# Patient Record
Sex: MALE | Race: WHITE | HISPANIC OR LATINO | Employment: FULL TIME | URBAN - METROPOLITAN AREA
[De-identification: names, ages, dates, MRNs, and addresses within clinical notes are randomized per-mention and may not be internally consistent; named-entity substitution may affect disease eponyms.]

---

## 2020-01-29 ENCOUNTER — APPOINTMENT (OUTPATIENT)
Dept: RADIOLOGY | Facility: MEDICAL CENTER | Age: 45
End: 2020-01-29
Attending: EMERGENCY MEDICINE
Payer: COMMERCIAL

## 2020-01-29 ENCOUNTER — HOSPITAL ENCOUNTER (EMERGENCY)
Facility: MEDICAL CENTER | Age: 45
End: 2020-01-29
Attending: EMERGENCY MEDICINE
Payer: COMMERCIAL

## 2020-01-29 VITALS
HEIGHT: 69 IN | BODY MASS INDEX: 27.17 KG/M2 | WEIGHT: 183.42 LBS | DIASTOLIC BLOOD PRESSURE: 95 MMHG | OXYGEN SATURATION: 98 % | SYSTOLIC BLOOD PRESSURE: 130 MMHG | RESPIRATION RATE: 18 BRPM | TEMPERATURE: 99.9 F | HEART RATE: 66 BPM

## 2020-01-29 DIAGNOSIS — S61.311A LACERATION OF LEFT INDEX FINGER WITHOUT FOREIGN BODY WITH DAMAGE TO NAIL, INITIAL ENCOUNTER: ICD-10-CM

## 2020-01-29 PROCEDURE — 90471 IMMUNIZATION ADMIN: CPT

## 2020-01-29 PROCEDURE — 304217 HCHG IRRIGATION SYSTEM

## 2020-01-29 PROCEDURE — 99283 EMERGENCY DEPT VISIT LOW MDM: CPT

## 2020-01-29 PROCEDURE — 73140 X-RAY EXAM OF FINGER(S): CPT | Mod: LT

## 2020-01-29 PROCEDURE — 303747 HCHG EXTRA SUTURE

## 2020-01-29 PROCEDURE — 90715 TDAP VACCINE 7 YRS/> IM: CPT | Performed by: EMERGENCY MEDICINE

## 2020-01-29 PROCEDURE — 700111 HCHG RX REV CODE 636 W/ 250 OVERRIDE (IP): Performed by: EMERGENCY MEDICINE

## 2020-01-29 PROCEDURE — 700101 HCHG RX REV CODE 250

## 2020-01-29 PROCEDURE — 304999 HCHG REPAIR-SIMPLE/INTERMED LEVEL 1

## 2020-01-29 RX ORDER — LIDOCAINE HYDROCHLORIDE AND EPINEPHRINE BITARTRATE 20; .01 MG/ML; MG/ML
10 INJECTION, SOLUTION SUBCUTANEOUS ONCE
Status: COMPLETED | OUTPATIENT
Start: 2020-01-29 | End: 2020-01-29

## 2020-01-29 RX ADMIN — LIDOCAINE HYDROCHLORIDE,EPINEPHRINE BITARTRATE 10 ML: 20; .01 INJECTION, SOLUTION INFILTRATION; PERINEURAL at 13:45

## 2020-01-29 RX ADMIN — CLOSTRIDIUM TETANI TOXOID ANTIGEN (FORMALDEHYDE INACTIVATED), CORYNEBACTERIUM DIPHTHERIAE TOXOID ANTIGEN (FORMALDEHYDE INACTIVATED), BORDETELLA PERTUSSIS TOXOID ANTIGEN (GLUTARALDEHYDE INACTIVATED), BORDETELLA PERTUSSIS FILAMENTOUS HEMAGGLUTININ ANTIGEN (FORMALDEHYDE INACTIVATED), BORDETELLA PERTUSSIS PERTACTIN ANTIGEN, AND BORDETELLA PERTUSSIS FIMBRIAE 2/3 ANTIGEN 0.5 ML: 5; 2; 2.5; 5; 3; 5 INJECTION, SUSPENSION INTRAMUSCULAR at 13:39

## 2020-01-29 SDOH — HEALTH STABILITY: MENTAL HEALTH: HOW OFTEN DO YOU HAVE 6 OR MORE DRINKS ON ONE OCCASION?: WEEKLY

## 2020-01-29 SDOH — HEALTH STABILITY: MENTAL HEALTH: HOW OFTEN DO YOU HAVE A DRINK CONTAINING ALCOHOL?: 2-3 TIMES A WEEK

## 2020-01-29 SDOH — HEALTH STABILITY: MENTAL HEALTH: HOW MANY STANDARD DRINKS CONTAINING ALCOHOL DO YOU HAVE ON A TYPICAL DAY?: 7 TO 9

## 2020-01-29 ASSESSMENT — PAIN SCALES - WONG BAKER: WONGBAKER_NUMERICALRESPONSE: DOESN'T HURT AT ALL

## 2020-01-29 NOTE — ED PROVIDER NOTES
"ED Provider Note    CHIEF COMPLAINT  Chief Complaint   Patient presents with   • Laceration       HPI  Charles Eugene is a 44 y.o. male who presents with a laceration to the tip of the left second phalanx.  The patient inadvertently cut himself on a metal pallet while at work.  He presents the emerge department with localized discomfort to the tip of the left second phalanx where he has a laceration that goes through the nailbed as well as to the ulnar and radial aspect of the finger into the volar aspect of the left second phalanx.  The patient does not have any loss of extension or flexion at the IP joints.  He is unaware of any other injuries.  He states his tetanus is not up-to-date.    REVIEW OF SYSTEMS  No other musculoskeletal complaints, no recent fevers    PHYSICAL EXAM  VITAL SIGNS: /88   Pulse 66   Temp 36.8 °C (98.3 °F) (Temporal)   Resp 16   Ht 1.753 m (5' 9\")   Wt 83.2 kg (183 lb 6.8 oz) Comment: Simultaneous filing. User may not have seen previous data.  SpO2 96%   BMI 27.09 kg/m²   In general the patient appears uncomfortable but nontoxic    Extremities the patient has a laceration that goes through the nail plate dorsally on the left second phalanx and into the palmar aspect of the left second phalanx with a total length laceration approximately 1 cm.  The patient does not have any skeletal deformities proximal to the DIP joint of the left second phalanx.  The distal phalanx does appear slightly angulated consistent with a tuft fracture.    Skin the laceration described above    Neurovascular examination is intact of the left hand    RADIOLOGY/  DX-FINGER(S) 2+ LEFT   Final Result      1.  Negative for 2nd digit fracture      2.  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  After the digital block was performed we irrigated the wound with " saline.  I then placed one 4-0 Ethilon suture through the nail plate for support as well as one 4-0 Ethilon suture to the radial aspect of the nail for closure.  The patient tolerated the procedure well.    COURSE & MEDICAL DECISION MAKING  Pertinent Labs & Imaging studies reviewed. (See chart for details)  This is a 44-year-old male who presents the emerge department with a crush injury to the distal left second phalanx.  He did have a laceration that included the nail plate and this was repaired primarily.  The patient will receive tetanus prophylaxis prior to discharge.  The patient will be discharged with instructions to follow-up with [facility] in 10 days for suture removal and sooner for signs of infection.  The x-ray does not show any evidence of a fracture.    FINAL IMPRESSION  1.  1 cm laceration to left second phalanx involving the nail plate       Disposition  The patient will be discharged in stable condition    Electronically signed by: Dylan Kessler M.D., 1/29/2020 1:28 PM

## 2020-01-29 NOTE — ED TRIAGE NOTES
Pt ambulates to triage with co worker  Chief Complaint   Patient presents with   • Laceration   laceration to left 1t finger from a metal pallet at work, wound dressed, not bleeding through dressing, pt reports laceration is through nail.  Pt A & 0 x 4, speech clear, ambulates well  Pt asked to wait in lobby, pt updated on triage process and pt asked to inform RN of any changes.

## 2020-01-29 NOTE — LETTER
"  FORM C-4:  EMPLOYEE’S CLAIM FOR COMPENSATION/ REPORT OF INITIAL TREATMENT  EMPLOYEE’S CLAIM - PROVIDE ALL INFORMATION REQUESTED   First Name Charles Last Name Jabier Birthdate 1975  Sex male Claim Number   Home Employee Address [address]                                     Zip  [address] Height  1.753 m (5' 9\") Weight  83.2 kg (183 lb 6.8 oz) [facility]     Mailing Employee Address [address]               Zip  [address] Telephone  [phone] (home)  Primary Language Spoken   Insurer   Third Party   BERTO GERMAN Employee's Occupation (Job Title) When Injury or Occupational Disease Occurred     Employer's Name [employer] Telephone [phone]    Employer Address [address] [facility] [address] Zip [address]   Date of Injury  1/29/2020       Hour of Injury  11:15 AM Date Employer Notified  1/29/2020 Last Day of Work after Injury or Occupational Disease  1/29/2020 Supervisor to Whom Injury Reported  Hiren Cartwright   Address or Location of Accident (if applicable) [[address] [place] [address]]   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 Be specific and answer in detail. Use additional sheet if necessary)  Smash my finger between a tire and a metal pallet.   If you believe that you have an occupational disease, when did you first have knowledge of the disability and it relationship to your employment? N/A Witnesses to the Accident  Camron Hunderson   Nature of Injury or Occupational Disease  Workers' Compensation Part(s) of Body Injured or Affected  Finger (L), N/A, N/A    I CERTIFY THAT THE ABOVE IS TRUE AND CORRECT TO THE BEST OF MY KNOWLEDGE AND THAT I HAVE PROVIDED THIS INFORMATION IN ORDER TO OBTAIN THE BENEFITS OF [place] " INCLUSIVE OR CHAPTER 617 [place]).  I HEREBY AUTHORIZE ANY PHYSICIAN, CHIROPRACTOR, SURGEON, PRACTITIONER, OR OTHER PERSON, ANY HOSPITAL, INCLUDING [facility] OR [facility] H[facility]PITAL, ANY MEDICAL SERVICE ORGANIZATION, ANY INSURANCE COMPANY, OR OTHER INSTITUTION OR ORGANIZATION TO RELEASE TO EACH OTHER, ANY MEDICAL OR OTHER INFORMATION, INCLUDING BENEFITS PAID OR PAYABLE, PERTINENT TO THIS INJURY OR DISEASE, EXCEPT INFORMATION RELATIVE TO DIAGNOSIS, TREATMENT AND/OR COUNSELING FOR AIDS, PSYCHOLOGICAL CONDITIONS, ALCOHOL OR CONTROLLED SUBSTANCES, FOR WHICH I MUST GIVE SPECIFIC AUTHORIZATION.  A PHOTOSTAT OF THIS AUTHORIZATION SHALL BE AS VALID AS THE ORIGINAL.  Date: 01/29/2020          Place:[facility]                      Employee’s Signature:   THIS REPORT MUST BE COMPLETED AND MAILED WITHIN 3 WORKING DAYS OF TREATMENT   [facility], EMERGENCY DEPT                                                                             Name of Facility [facility]   Date  1/29/2020 Diagnosis  (S61.311A) Laceration of left index finger without foreign body with damage to nail, initial encounter Is there evidence the injured employee was under the influence of alcohol and/or another controlled substance at the time of accident?   Hour  3:02 PM Description of Injury or Disease  Laceration of left index finger without foreign body with damage to nail, initial encounter No   Treatment  Primary closure after irrigation  Have you advised the patient to remain off work five days or more?         No   X-Ray Findings  Negative If Yes   From Date    To Date      From information given by the employee, together with medical evidence, can you directly connect this injury or occupational disease as job incurred? Yes If No, is employee capable of: Full Duty  Yes Modified Duty      Is additional medical care by a physician indicated? Yes If Modified Duty,  "Specify any Limitations / Restrictions       Do you know of any previous injury or disease contributing to this condition or occupational disease? No    Date 1/29/2020 Print Doctor’s Name Dylan Kessler certify the employer’s copy of this form was mailed on:   Address [address] [id number] [phone] INSURER’S USE ONLY   Provider’s Tax ID Number [id number] Telephone Dept: [phone]    Doctor’s Signature e-DYLAN Grant M.D. Degree     MD      Form C-4 (rev.10/07)                                                                         BRIEF DESCRIPTION OF RIGHTS AND BENEFITS  (Pursuant to [place] 616C.050)    Notice of Injury or Occupational Disease (Incident Report Form C-1): If an injury or occupational disease (OD) arises out of and in the course of employment, you must provide written notice to your employer as soon as practicable, but no later than 7 days after the accident or OD. Your employer shall maintain a sufficient supply of the required forms.    Claim for Compensation (Form C-4): If medical treatment is sought, the form C-4 is available at the place of initial treatment. A completed \"Claim for Compensation\" (Form C-4) must be filed within 90 days after an accident or OD. The treating physician or chiropractor must, within 3 working days after treatment, complete and mail to the employer, the employer's insurer and third-party , the Claim for Compensation.    Medical Treatment: If you require medical treatment for your on-the-job injury or OD, you may be required to select a physician or chiropractor from a list provided by your workers’ compensation insurer, if it has contracted with an Organization for Managed Care (MCO) or Preferred Provider Organization (PPO) or providers of health care. If your employer has not entered into a contract with an MCO or PPO, you may select a physician or chiropractor from the Panel of Physicians and Chiropractors. Any " medical costs related to your industrial injury or OD will be paid by your insurer.    Temporary Total Disability (TTD): If your doctor has certified that you are unable to work for a period of at least 5 consecutive days, or 5 cumulative days in a 20-day period, or places restrictions on you that your employer does not accommodate, you may be entitled to TTD compensation.    Temporary Partial Disability (TPD): If the wage you receive upon reemployment is less than the compensation for TTD to which you are entitled, the insurer may be required to pay you TPD compensation to make up the difference. TPD can only be paid for a maximum of 24 months.    Permanent Partial Disability (PPD): When your medical condition is stable and there is an indication of a PPD as a result of your injury or OD, within 30 days, your insurer must arrange for an evaluation by a rating physician or chiropractor to determine the degree of your PPD. The amount of your PPD award depends on the date of injury, the results of the PPD evaluation and your age and wage.    Permanent Total Disability (PTD): If you are medically certified by a treating physician or chiropractor as permanently and totally disabled and have been granted a PTD status by your insurer, you are entitled to receive monthly benefits not to exceed 66 2/3% of your average monthly wage. The amount of your PTD payments is subject to reduction if you previously received a PPD award.    Vocational Rehabilitation Services: You may be eligible for vocational rehabilitation services if you are unable to return to the job due to a permanent physical impairment or permanent restrictions as a result of your injury or occupational disease.    Transportation and Per Smita Reimbursement: You may be eligible for travel expenses and per smita associated with medical treatment.    Reopening: You may be able to reopen your claim if your condition worsens after claim closure.     Appeal Process:  If you disagree with a written determination issued by the insurer or the insurer does not respond to your request, you may appeal to the Department of Administration, , by following the instructions contained in your determination letter. You must appeal the determination within 70 days from the date of the determination letter at [address], [address], [place], [place] [address], or [address], [address], [place], [place] [address]. If you disagree with the  decision, you may appeal to the Department of Administration, . You must file your appeal within 30 days from the date of the  decision letter at [address], [address], [place], [place] [address], or [address], [address], [place], [place] [address]. If you disagree with a decision of an , you may file a petition for judicial review with the District Court. You must do so within 30 days of the Appeal Officer’s decision. You may be represented by an  at your own expense or you may contact the [facility] for possible representation.    [place]  for Injured Workers (NAIW): If you disagree with a  decision, you may request that NAIW represent you without charge at an  Hearing. For information regarding denial of benefits, you may contact the [facility] at: [address], [address], [place] [address], [phone], or [address], [address], [place] [address], [phone]    To File a Complaint with the Division: If you wish to file a complaint with the  of the Division of Industrial Relations (DIR),  please contact the Workers’ Compensation Section, [address] [place], [place], [place] [address], telephone [phone], or [address], [address], [place], [place] [address], telephone [phone].    For assistance with Workers’ Compensation Issues: You may  contact the Office of the Governor Consumer Health Assistance, [address] [place] [address], [address], [place], [place] [address], Toll Free 1-155.847.8066, Web site: http://govcha.[facility].nv., E-mail [email or web address].  D-2 (rev. 06/18)              __________________________________________________________________                                    ___01/29/2020______________            Employee Name / Signature                                                                                                                            Date

## 2020-01-29 NOTE — ED NOTES
ERP at bedside. Pt agrees with plan of care discussed by ERP. AIDET acknowledged with patient.  Lac numbed, irrigated , dressing applied. X-александр done. Gurney in low position, side rail up for pt safety. Call light within reach. Will continue to monitor.

## 2020-02-04 ENCOUNTER — OCCUPATIONAL MEDICINE (OUTPATIENT)
Dept: URGENT CARE | Facility: CLINIC | Age: 45
End: 2020-02-04
Payer: COMMERCIAL

## 2020-02-04 VITALS
DIASTOLIC BLOOD PRESSURE: 82 MMHG | HEART RATE: 60 BPM | BODY MASS INDEX: 26.58 KG/M2 | OXYGEN SATURATION: 100 % | SYSTOLIC BLOOD PRESSURE: 116 MMHG | TEMPERATURE: 98.6 F | WEIGHT: 180 LBS | RESPIRATION RATE: 18 BRPM

## 2020-02-04 DIAGNOSIS — S61.311D LACERATION OF LEFT INDEX FINGER WITHOUT FOREIGN BODY WITH DAMAGE TO NAIL, SUBSEQUENT ENCOUNTER: ICD-10-CM

## 2020-02-04 PROCEDURE — 99213 OFFICE O/P EST LOW 20 MIN: CPT | Performed by: FAMILY MEDICINE

## 2020-02-04 NOTE — LETTER
[facility] [address] [place] [address]  Phone:  [phone] - Fax:  [phone]   [facility] Progress Report and Disability Certification  Date of Service: 2/4/2020   No Show:  No  Date / Time of Next Visit: 2/09/2020@12:00 PM   Claim Information   Patient Name: Charles Eugene  Claim Number:     Employer: [employer]  Date of Injury: 1/29/2020     Insurer / TPA: Sharon Luo  ID / SSN:     Occupation:   Diagnosis: The encounter diagnosis was Laceration of left index finger without foreign body with damage to nail, subsequent encounter.    Medical Information   Related to Industrial Injury? Yes    Subjective Complaints:    DOI: 1/29    Status post crush injury to distal left 2nd phalanx with laceration repaired with sutures.       Reports no pain or fever or discharge from wound     Objective Findings:     Left index finger:   Laceration is clean and dry with sutures intact.  Cap refill < 2 s.    Normal sensation noted. Normal strength noted.    Pre-Existing Condition(s):     Assessment:   Condition Improved    Status: Additional Care Required  Permanent Disability:No    Plan:      Diagnostics:      Comments:       Disability Information   Status: Released to Restricted Duty    From:  2/4/2020  Through: 2/11/2020 Restrictions are:     Physical Restrictions   Sitting:    Standing:    Stooping:    Bending:      Squatting:    Walking:    Climbing:    Pushing:      Pulling:    Other:    Reaching Above Shoulder (L):   Reaching Above Shoulder (R):       Reaching Below Shoulder (L):    Reaching Below Shoulder (R):      Not to exceed Weight Limits   Carrying(hrs):   Weight Limit(lb): < or = to 10 pounds  Comments:left Lifting(hrs):   Weight  Limit(lb): < or = to 10 pounds  Comments:left   Comments: Laceration of left index finger without foreign body with damage to nail, subsequent encounter  Healing well - no signs of  infection  Restrictions per D39  F/u 5-7 d for suture removal       Repetitive Actions   Hands: i.e. Fine Manipulations from Grasping:     Feet: i.e. Operating Foot Controls:     Driving / Operate Machinery:     Physician Name: Edmar Langford M.D. Physician Signature: EDMAR Ochoa M.D. e-Signature: Dr. Nagi Salazar, Medical Director   Clinic Name / Location: [address] [address] Clinic Phone Number: Dept: [phone]   Appointment Time: 12:30 Pm Visit Start Time: 12:33 PM   Check-In Time:  12:21 Pm Visit Discharge Time:  1:26 PM   Original-Treating Physician or Chiropractor    Page 2-Insurer/TPA    Page 3-Employer    Page 4-Employee

## 2020-02-04 NOTE — PROGRESS NOTES
Subjective:      Chief Complaint   Patient presents with   • Finger Injury     WC FV DOI-1/29/20-(L)hurts when moving too much         HPI     DOI: 1/29    Status post crush injury to distal left 2nd phalanx with laceration repaired with sutures.       Reports no pain or fever or discharge from wound             Social History     Tobacco Use   • Smoking status: Never Smoker   • Smokeless tobacco: Never Used   Substance Use Topics   • Alcohol use: Yes     Frequency: 2-3 times a week     Drinks per session: 7 to 9     Binge frequency: Weekly   • Drug use: Never             No current outpatient medications on file prior to visit.     No current facility-administered medications on file prior to visit.          No past medical history on file.            Review of Systems   Constitutional: Negative for fever.   Respiratory: Negative for cough.    Cardiovascular: Negative for chest pain.   All other systems reviewed and are negative.         Objective:     /82 (BP Location: Left arm)   Pulse 60   Temp 37 °C (98.6 °F) (Temporal)   Resp 18   Wt 81.6 kg (180 lb)   SpO2 100%     Physical Exam   Constitutional: pt is oriented to person, place, and time. Pt appears well-developed and well-nourished. No distress.   HENT:   Head: Normocephalic and atraumatic.   Eyes: Conjunctivae are normal.   Cardiovascular: Normal rate.  RRR.  No murmer   Pulmonary/Chest: Effort normal.  CTAB  Musculoskeletal:        Left index finger:   Laceration is clean and dry with sutures intact.  Cap refill < 2 s.    Normal sensation noted. Normal strength noted.   Neurological: pt is alert and oriented to person, place, and time.   Skin: Skin is warm. Pt is not diaphoretic. No erythema.   Nursing note and vitals reviewed.              Assessment/Plan:     1. Laceration of left index finger without foreign body with damage to nail, subsequent encounter  Healing well - no signs of infection  Restrictions per D39  F/u 5-7 d for suture removal

## 2020-02-09 ENCOUNTER — OCCUPATIONAL MEDICINE (OUTPATIENT)
Dept: URGENT CARE | Facility: CLINIC | Age: 45
End: 2020-02-09
Payer: COMMERCIAL

## 2020-02-09 VITALS
HEIGHT: 69 IN | BODY MASS INDEX: 26.66 KG/M2 | OXYGEN SATURATION: 99 % | RESPIRATION RATE: 16 BRPM | TEMPERATURE: 98 F | DIASTOLIC BLOOD PRESSURE: 72 MMHG | HEART RATE: 80 BPM | SYSTOLIC BLOOD PRESSURE: 118 MMHG | WEIGHT: 180 LBS

## 2020-02-09 DIAGNOSIS — S61.311S LACERATION OF LEFT INDEX FINGER WITHOUT FOREIGN BODY WITH DAMAGE TO NAIL, SEQUELA: ICD-10-CM

## 2020-02-09 DIAGNOSIS — Z48.02 VISIT FOR SUTURE REMOVAL: ICD-10-CM

## 2020-02-09 PROCEDURE — 99213 OFFICE O/P EST LOW 20 MIN: CPT | Performed by: NURSE PRACTITIONER

## 2020-02-09 ASSESSMENT — ENCOUNTER SYMPTOMS
ROS SKIN COMMENTS: HEALING LACERATION
FEVER: 0
SHORTNESS OF BREATH: 0
CHILLS: 0

## 2020-02-09 NOTE — PROGRESS NOTES
"Subjective:   Charles Eugene is a 44 y.o. male who presents for Work-Related Injury (W/C FV DOI 1/29/2020. (L) finger injury, feeling. 2 suture removal. )    DOI 1/29/2020: Patient with laceration to the left index finger. x2 sutures in place. Patient reports mild pain when bending finger. Has been wearing brace.   HPI     Review of Systems   Constitutional: Negative for chills and fever.   Respiratory: Negative for shortness of breath.    Cardiovascular: Negative for chest pain.   Skin:        Healing laceration     PMH:  has no past medical history on file.  ALLERGIES:   Allergies   Allergen Reactions   • Pcn [Penicillins]      Oral swelling       Patient's PMH, SocHx, SurgHx, FamHx, Drug allergies and medications reviewed.     Objective:   /72   Pulse 80   Temp 36.7 °C (98 °F) (Temporal)   Resp 16   Ht 1.753 m (5' 9\")   Wt 81.6 kg (180 lb)   SpO2 99%   BMI 26.58 kg/m²   Physical Exam  Vitals signs reviewed.   Constitutional:       General: He is not in acute distress.     Appearance: He is well-developed. He is not diaphoretic.   HENT:      Head: Normocephalic.      Right Ear: Hearing normal.      Left Ear: Hearing normal.      Nose: Nose normal.   Eyes:      General: Lids are normal.      Conjunctiva/sclera: Conjunctivae normal.      Pupils: Pupils are equal, round, and reactive to light.   Neck:      Musculoskeletal: Normal range of motion.   Cardiovascular:      Rate and Rhythm: Normal rate and regular rhythm.      Heart sounds: Normal heart sounds.   Pulmonary:      Effort: Pulmonary effort is normal. No respiratory distress.      Breath sounds: Normal breath sounds. No decreased breath sounds or wheezing.   Musculoskeletal:        Hands:    Skin:     General: Skin is warm.      Findings: No rash.   Neurological:      Mental Status: He is alert.   Psychiatric:         Speech: Speech normal.         Behavior: Behavior normal.         Thought Content: Thought content normal.      " Judgment: Judgment normal.       Left index finger: x2 sutures in place without surrounding erythema or discharge present. Nailbed intact, slightly lifted. Neurovascular and sensation intact. ROM index finger normal   Assessment/Plan:   Assessment    1. Laceration of left index finger without foreign body with damage to nail, sequela     2. Visit for suture removal       Sutures removal performed by myself.  Restrictions per D39    Differential diagnosis, natural history, supportive care, and indications for immediate follow-up discussed.     **Please note that all invasive procedures during this visit were performed by myself and/or the Medical Assistant under the supervision of the PA or MD in office**

## 2020-02-09 NOTE — LETTER
[address] [place] [address]  Phone:  [phone] - Fax:  [phone]   [facility] Progress Report and Disability Certification  Date of Service: 2/9/2020   No Show:  No  Date / Time of Next Visit: 2/15/2020 @ 12:00 PM   Claim Information   Patient Name: Charles Eugene  Claim Number:     Employer: [employer]  Date of Injury: 1/29/2020     Insurer / TPA: Sharon Luo  ID / SSN:     Occupation:   Diagnosis: Diagnoses of Laceration of left index finger without foreign body with damage to nail, sequela and Visit for suture removal were pertinent to this visit.    Medical Information   Related to Industrial Injury? Yes    Subjective Complaints:  DOI 1/29/2020: Patient with laceration to the left index finger. x2 sutures in place. Patient reports mild pain when bending finger. Has been wearing brace.   Objective Findings: Left index finger: x2 sutures in place without surrounding erythema or discharge present. Nailbed intact, slightly lifted. Neurovascular and sensation intact. ROM index finger normal   Pre-Existing Condition(s):     Assessment:   Condition Improved    Status: Additional Care Required  Permanent Disability:No    Plan:      Diagnostics:      Comments:  x2 sutures removed in office. x3 steri strips placed over the nail plate.    Disability Information   Status: Released to Restricted Duty    From:  2/9/2020  Through: 2/15/2020 Restrictions are: Temporary   Physical Restrictions   Sitting:    Standing:    Stooping:    Bending:      Squatting:    Walking:    Climbing:    Pushing:      Pulling:    Other:    Reaching Above Shoulder (L):   Reaching Above Shoulder (R):       Reaching Below Shoulder (L):    Reaching Below Shoulder (R):      Not to exceed Weight Limits   Carrying(hrs):   Weight Limit(lb): < or = to 10 pounds Lifting(hrs):   Weight  Limit(lb): < or = to 10 pounds   Comments: Must keep  area covered while at work and wear finger brace for protection. Follow up in 6 days for recheck. Anticipate return to full duty/discharge.    Repetitive Actions   Hands: i.e. Fine Manipulations from Grasping:     Feet: i.e. Operating Foot Controls:     Driving / Operate Machinery:     Physician Name: CARMENZA Gross Physician Signature: ESTEE Cotton e-Signature: Dr. Nagi Salazar, Medical Director   Clinic Name / Location: [address] [address] Clinic Phone Number: Dept: [phone]   Appointment Time: 12:00 Pm Visit Start Time: 12:05 PM   Check-In Time:  11:43 Am Visit Discharge Time:  12:28 P   Original-Treating Physician or Chiropractor    Page 2-Insurer/TPA    Page 3-Employer    Page 4-Employee

## 2020-02-15 ENCOUNTER — OCCUPATIONAL MEDICINE (OUTPATIENT)
Dept: URGENT CARE | Facility: CLINIC | Age: 45
End: 2020-02-15
Payer: COMMERCIAL

## 2020-02-15 VITALS
BODY MASS INDEX: 26.66 KG/M2 | HEIGHT: 69 IN | DIASTOLIC BLOOD PRESSURE: 72 MMHG | SYSTOLIC BLOOD PRESSURE: 110 MMHG | OXYGEN SATURATION: 95 % | TEMPERATURE: 98.6 F | HEART RATE: 70 BPM | RESPIRATION RATE: 14 BRPM | WEIGHT: 180 LBS

## 2020-02-15 DIAGNOSIS — S69.92XS INJURY OF FINGER OF LEFT HAND, SEQUELA: ICD-10-CM

## 2020-02-15 PROCEDURE — 99213 OFFICE O/P EST LOW 20 MIN: CPT | Performed by: FAMILY MEDICINE

## 2020-02-15 NOTE — PATIENT INSTRUCTIONS
Fingertip Injuries and Amputations  Fingertip injuries are common and often get injured because they are last to escape when pulling your hand out of harm's way. You have amputated (cut off) part of your finger. How this turns out depends largely on how much was amputated. If just the tip is amputated, often the end of the finger will grow back and the finger may return to much the same as it was before the injury.   If more of the finger is missing, your caregiver has done the best with the tissue remaining to allow you to keep as much finger as is possible. Your caregiver after checking your injury has tried to leave you with a painless fingertip that has durable, feeling skin. If possible, your caregiver has tried to maintain the finger's length and appearance and preserve its fingernail.   Please read the instructions outlined below and refer to this sheet in the next few weeks. These instructions provide you with general information on caring for yourself. Your caregiver may also give you specific instructions. While your treatment has been done according to the most current medical practices available, unavoidable complications occasionally occur. If you have any problems or questions after discharge, please call your caregiver.  HOME CARE INSTRUCTIONS   · You may resume normal diet and activities as directed or allowed.  · Keep your hand elevated above the level of your heart. This helps decrease pain and swelling.  · Keep ice packs (or a bag of ice wrapped in a towel) on the injured area for 15-20 minutes, 3-4 times per day, for the first two days.  · Change dressings if necessary or as directed.  · Clean the wound daily or as directed.  · Only take over-the-counter or prescription medicines for pain, discomfort, or fever as directed by your caregiver.  · Keep appointments as directed.  SEEK IMMEDIATE MEDICAL CARE IF:  · You develop redness, swelling, numbness or increasing pain in the wound.  · There is pus  coming from the wound.  · You develop an unexplained oral temperature above 102° F (38.9° C) or as your caregiver suggests.  · There is a foul (bad) smell coming from the wound or dressing.  · There is a breaking open of the wound (edges not staying together) after sutures or staples have been removed.  MAKE SURE YOU:   · Understand these instructions.  · Will watch your condition.  · Will get help right away if you are not doing well or get worse.  Document Released: 11/08/2006 Document Revised: 03/11/2013 Document Reviewed: 10/07/2009  [employer]® Patient Information ©2014 [employer].

## 2020-02-15 NOTE — LETTER
[address] [place] [address]  Phone:  [phone] - Fax:  [phone]   [facility] Progress Report and Disability Certification  Date of Service: 2/15/2020   No Show:  No  Date / Time of Next Visit: 2/22/2020 @12 PM   Claim Information   Patient Name: Charles Eugene  Claim Number:     Employer: [employer]  Date of Injury: 1/29/2020     Insurer / TPA: Sharon Luo  ID / SSN:     Occupation:   Diagnosis: The encounter diagnosis was Injury of finger of left hand, sequela.    Medical Information   Related to Industrial Injury?      Subjective Complaints:  Patient still has some pain on the tip of the finger, and has trouble bending the finger in a full extension and flexion motion,    Objective Findings: The wound is healed, however it is not completely, there is pain with flexion and extension of the tip of the finger   Pre-Existing Condition(s):     Assessment:   Condition Improved    Status: Additional Care Required  Permanent Disability:No    Plan:      Diagnostics:      Comments:       Disability Information   Status: Released to Restricted Duty    From:  2/15/2020  Through: 2/22/2020 Restrictions are: Temporary   Physical Restrictions   Sitting:    Standing:    Stooping:    Bending:      Squatting:    Walking:    Climbing:    Pushing:      Pulling:    Other:    Reaching Above Shoulder (L):   Reaching Above Shoulder (R):       Reaching Below Shoulder (L):    Reaching Below Shoulder (R):      Not to exceed Weight Limits   Carrying(hrs):   Weight Limit(lb): < or = to 10 pounds Lifting(hrs):   Weight  Limit(lb): < or = to 10 pounds   Comments: 1.  Please allow the patient to have light duty and restrict lifting to less than 10 pounds until the finger is completely healed since he is unable to make full fist at this time and the lifting heavy object can damage the finger furthermore    Repetitive Actions     Hands: i.e. Fine Manipulations from Grasping:     Feet: i.e. Operating Foot Controls:     Driving / Operate Machinery:     Physician Name: Melissa Hernadez M.D. Physician Signature: MELISSA Flores M.D. e-Signature: Dr. Ngai Salazar, Medical Director   Clinic Name / Location: [address] [address] Clinic Phone Number: Dept: [phone]   Appointment Time: 12:00 Pm Visit Start Time: 11:40 AM   Check-In Time:  11:34 Am Visit Discharge Time:  12 pm   Original-Treating Physician or Chiropractor    Page 2-Insurer/TPA    Page 3-Employer    Page 4-Employee

## 2020-02-15 NOTE — PROGRESS NOTES
"Subjective:      Charles Eugene is a 44 y.o. male who presents with Finger Injury (left index finger, still sharp pain, pressure pain x 01/29/2020)      Patient still has some pain on the tip of the finger, and has trouble bending the finger in a full extension and flexion motion,         DOI: 1/29     Status post crush injury to distal left 2nd phalanx with laceration repaired with sutures.      Second visit, in the last visit patient had sutures removed, and the area has healed, however not completely, patient has a hard time bending the finger into full flexion position secondary to slight pain on the tip of the finger      Review of Systems   Musculoskeletal: Positive for joint pain.   All other systems reviewed and are negative.         Objective:     /72   Pulse 70   Temp 37 °C (98.6 °F) (Temporal)   Resp 14   Ht 1.753 m (5' 9\")   Wt 81.6 kg (180 lb)   SpO2 95%   BMI 26.58 kg/m²      Physical Exam  Constitutional:       Appearance: Normal appearance.   HENT:      Head: Normocephalic and atraumatic.      Right Ear: Tympanic membrane normal.      Left Ear: Tympanic membrane normal.      Nose: Nose normal.   Cardiovascular:      Pulses: Normal pulses.   Abdominal:      General: Abdomen is flat.   Musculoskeletal:         General: Tenderness and signs of injury present. No swelling or deformity.        Hands:       Right lower leg: No edema.      Left lower leg: No edema.   Neurological:      Mental Status: He is alert.         The wound is healed, however it is not completely, there is pain with flexion and extension of the tip of the finger       Assessment/Plan:       1. Injury of finger of left hand, sequela       -Given the fact that patient job requires him to lift heavy objects and he needs his  to hold them it is best if patient has another week or 2 of light duty that does not include any lifting heavy objects or at least objects less than 10 pounds  -The area has healed, however " not 100% and there is still tenderness on the tip of the finger, the x-ray that was originally done did not show any injury  -We will wrap the patient's finger,, patient to come back again next week for reevaluation at that point if he is able to go back to full duty we can release the patient to full duty

## 2020-02-22 ENCOUNTER — OCCUPATIONAL MEDICINE (OUTPATIENT)
Dept: URGENT CARE | Facility: CLINIC | Age: 45
End: 2020-02-22
Payer: COMMERCIAL

## 2020-02-22 VITALS
SYSTOLIC BLOOD PRESSURE: 122 MMHG | RESPIRATION RATE: 16 BRPM | DIASTOLIC BLOOD PRESSURE: 68 MMHG | BODY MASS INDEX: 26.66 KG/M2 | HEIGHT: 69 IN | OXYGEN SATURATION: 97 % | WEIGHT: 180 LBS | TEMPERATURE: 98.7 F | HEART RATE: 95 BPM

## 2020-02-22 DIAGNOSIS — S61.311D LACERATION OF LEFT INDEX FINGER WITHOUT FOREIGN BODY WITH DAMAGE TO NAIL, SUBSEQUENT ENCOUNTER: ICD-10-CM

## 2020-02-22 PROCEDURE — 99213 OFFICE O/P EST LOW 20 MIN: CPT | Performed by: PHYSICIAN ASSISTANT

## 2020-02-22 NOTE — LETTER
[facility] [address] [place] [address]  Phone:  [phone] - Fax:  [phone]   [facility] Progress Report and Disability Certification  Date of Service: 2/22/2020   No Show:  No  Date / Time of Next Visit: 2/29/2020 @ 12pm   Claim Information   Patient Name: Charles Eugene  Claim Number:     Employer: [employer]  Date of Injury: 1/29/2020     Insurer / TPA: Sharon Luo  ID / SSN:     Occupation:   Diagnosis: The encounter diagnosis was Laceration of left index finger without foreign body with damage to nail, subsequent encounter.    Medical Information   Related to Industrial Injury? Yes    Subjective Complaints:  DOI: 1/29/2020  Pt had a crush type injury to distal left index.  He was seen through the ER.  X-rays were negative from nail was affixed with suturing.  Patient has progressed over interim.  Returns to clinic today stating has had an improved pain.  Steri-Strips hold nail in place.  Reports normal sensation and normal motion with some stiffness having been splinted for much of the interim.  Denies fevers chills or drainage.  Denies redness or swelling.  Does feel ready to trial full duty.  Denies use of over-the-counter medicines for pain as it has been minimally painful.   Objective Findings: Gen: AOx3; Head: NC AT; Eyes: PERRLA/EOM; Lungs: NLR; Cardiac: RR by periph pulse exam; left index: No erythema ecchymosis or edema, full active range of motion, Steri-Strips holding fingernail in place, no erythema edema or evidence of discharge drainage from interval healing laceration through mid fingernail; neuro: N VID, normal sensation light touch, brisk capillary refill throughout   Pre-Existing Condition(s):     Assessment:   Condition Improved    Status: Additional Care Required  Permanent Disability:No    Plan:   Comments:Full duty now, OTC NSAIDs/Tylenol, okay to use protective finger splint for  pain if needed, follow-up in 1 week for recheck or sooner with problems      Diagnostics:      Comments:  Full duty now, OTC NSAIDs/Tylenol, okay to use protective finger splint for pain if needed, follow-up in 1 week for recheck or sooner with problems     Disability Information   Status: Released to Full Duty    From:  2/22/2020  Through: 2/29/2020 Restrictions are:     Physical Restrictions   Sitting:    Standing:    Stooping:    Bending:      Squatting:    Walking:    Climbing:    Pushing:      Pulling:    Other:    Reaching Above Shoulder (L):   Reaching Above Shoulder (R):       Reaching Below Shoulder (L):    Reaching Below Shoulder (R):      Not to exceed Weight Limits   Carrying(hrs):   Weight Limit(lb):   Lifting(hrs):   Weight  Limit(lb):     Comments: Full duty now, OTC NSAIDs/Tylenol, okay to use protective finger splint for pain if needed, follow-up in 1 week for recheck or sooner with problems     Repetitive Actions   Hands: i.e. Fine Manipulations from Grasping:     Feet: i.e. Operating Foot Controls:     Driving / Operate Machinery:     Physician Name: Chepe Caballero P.A.-C. Physician Signature: CHEPE Augustine P.A.-C. e-Signature: Dr. Nagi Salazar, Medical Director   Clinic Name / Location: [address] [address] Clinic Phone Number: Dept: [phone]   Appointment Time: 12:00 Pm Visit Start Time: 12:11 PM   Check-In Time:  11:58 Am Visit Discharge Time: 12:55 PM   Original-Treating Physician or Chiropractor    Page 2-Insurer/TPA    Page 3-Employer    Page 4-Employee

## 2020-02-22 NOTE — PROGRESS NOTES
"Subjective:      Charles Eugene is a 44 y.o. male who presents with Wound Check (original injury, 1/28/20 laceration, pt states finger is better but can use it on and off )      DOI: 1/29/2020  Pt had a crush type injury to distal left index.  He was seen through the ER.  X-rays were negative from nail was affixed with suturing.  Patient has progressed over interim.  Returns to clinic today stating has had an improved pain.  Steri-Strips hold nail in place.  Reports normal sensation and normal motion with some stiffness having been splinted for much of the interim.  Denies fevers chills or drainage.  Denies redness or swelling.  Does feel ready to trial full duty.  Denies use of over-the-counter medicines for pain as it has been minimally painful.     Wound Check         ROS       Objective:     /68 (Patient Position: Sitting)   Pulse 95   Temp 37.1 °C (98.7 °F) (Temporal)   Resp 16   Ht 1.753 m (5' 9\")   Wt 81.6 kg (180 lb)   SpO2 97%   BMI 26.58 kg/m²      Physical Exam    Gen: AOx3; Head: NC AT; Eyes: PERRLA/EOM; Lungs: NLR; Cardiac: RR by periph pulse exam; left index: No erythema ecchymosis or edema, full active range of motion, Steri-Strips holding fingernail in place, no erythema edema or evidence of discharge drainage from interval healing laceration through mid fingernail; neuro: N VID, normal sensation light touch, brisk capillary refill throughout       Assessment/Plan:       1. Laceration of left index finger without foreign body with damage to nail, subsequent encounter  Full duty now, OTC NSAIDs/Tylenol, okay to use protective finger splint for pain if needed, follow-up in 1 week for recheck or sooner with problems     "

## 2020-02-29 ENCOUNTER — OCCUPATIONAL MEDICINE (OUTPATIENT)
Dept: URGENT CARE | Facility: CLINIC | Age: 45
End: 2020-02-29
Payer: COMMERCIAL

## 2020-02-29 VITALS
HEIGHT: 69 IN | OXYGEN SATURATION: 96 % | SYSTOLIC BLOOD PRESSURE: 124 MMHG | HEART RATE: 74 BPM | DIASTOLIC BLOOD PRESSURE: 70 MMHG | WEIGHT: 180 LBS | BODY MASS INDEX: 26.66 KG/M2 | RESPIRATION RATE: 16 BRPM | TEMPERATURE: 98.3 F

## 2020-02-29 DIAGNOSIS — S61.311D LACERATION OF LEFT INDEX FINGER WITHOUT FOREIGN BODY WITH DAMAGE TO NAIL, SUBSEQUENT ENCOUNTER: ICD-10-CM

## 2020-02-29 PROCEDURE — 99213 OFFICE O/P EST LOW 20 MIN: CPT | Performed by: INTERNAL MEDICINE

## 2020-02-29 ASSESSMENT — ENCOUNTER SYMPTOMS
SENSORY CHANGE: 0
WEAKNESS: 0

## 2020-02-29 NOTE — LETTER
[place] [address] [facility] [address] [place] [address]  Phone:  [phone] - Fax:  [phone]   [facility] Progress Report and Disability Certification  Date of Service: 2/29/2020   No Show:  No  Date / Time of Next Visit:     Claim Information   Patient Name: Charles Eugene  Claim Number:     Employer: [employer]  Date of Injury: 1/29/2020     Insurer / TPA: Sharon Luo  ID / SSN:     Occupation:   Diagnosis: The encounter diagnosis was Laceration of left index finger without foreign body with damage to nail, subsequent encounter.    Medical Information   Related to Industrial Injury? Yes    Subjective Complaints:  Subjective:  Charles Eugene is a 44 y.o. male who presents for Finger Injury ((L) index finger, has been getting better )      Patient states it is getting better and the range of motion is also improving and almost back to normal the laceration has healed, there is minimal tenderness and swelling but it is getting better and patient wants to be discharged and he says if it gets worse he will come back  DOI: 1/29/2020  Pt had a crush type injury to distal left index.  He was seen through the ER.  X-rays were negative from nail was affixed with suturing.  Patient has progressed over interim.  Returns to clinic today stating has had an improved pain.  Steri-Strips hold nail in place.  Reports normal sensation and normal motion with some stiffness having been splinted for much of the interim.  Denies fevers chills or drainage.  Denies redness or swelling.  Does feel ready to trial full duty.  Denies use of over-the-counter medicines for pain as it has been minimally painful.     Objective Findings: Physical Exam  Musculoskeletal:         General: Swelling, tenderness and signs of injury present.      Comments: Left index finger-healing laceration, minimal tenderness and swelling of the distal phalanx and  range of motion is slightly limited due to swelling, no tendon injury        Pre-Existing Condition(s):     Assessment:   Condition Improved    Status: Discharged /  MMI  Permanent Disability:No    Plan:      Diagnostics:      Comments:       Disability Information   Status: Released to Full Duty    From:     Through:   Restrictions are:     Physical Restrictions   Sitting:    Standing:    Stooping:    Bending:      Squatting:    Walking:    Climbing:    Pushing:      Pulling:    Other:    Reaching Above Shoulder (L):   Reaching Above Shoulder (R):       Reaching Below Shoulder (L):    Reaching Below Shoulder (R):      Not to exceed Weight Limits   Carrying(hrs):   Weight Limit(lb):   Lifting(hrs):   Weight  Limit(lb):     Comments:      Repetitive Actions   Hands: i.e. Fine Manipulations from Grasping:     Feet: i.e. Operating Foot Controls:     Driving / Operate Machinery:     Physician Name: Lamont Andrews M.D. Physician Signature: LAMONT Rajan M.D. e-Signature: Dr. Ngai Salazar, Medical Director   Clinic Name / Location: [address] [address] Clinic Phone Number: Dept: [phone]   Appointment Time: 12:00 Pm Visit Start Time: 12:03 PM   Check-In Time:  11:48 Am Visit Discharge Time: 12:34 PM   Original-Treating Physician or Chiropractor    Page 2-Insurer/TPA    Page 3-Employer    Page 4-Employee

## 2020-02-29 NOTE — PROGRESS NOTES
Subjective:   Charles Eugene is a 44 y.o. male who presents for Finger Injury ((L) index finger, has been getting better )    Subjective:  Charles Eugene is a 44 y.o. male who presents for Finger Injury ((L) index finger, has been getting better )      Patient states it is getting better and the range of motion is also improving and almost back to normal the laceration has healed, there is minimal tenderness and swelling but it is getting better and patient wants to be discharged and he says if it gets worse he will come back  DOI: 1/29/2020  Pt had a crush type injury to distal left index.  He was seen through the ER.  X-rays were negative from nail was affixed with suturing.  Patient has progressed over interim.  Returns to clinic today stating has had an improved pain.  Steri-Strips hold nail in place.  Reports normal sensation and normal motion with some stiffness having been splinted for much of the interim.  Denies fevers chills or drainage.  Denies redness or swelling.  Does feel ready to trial full duty.  Denies use of over-the-counter medicines for pain as it has been minimally painful.   Patient states it is getting better and the range of motion is also improving and almost back to normal the laceration has healed, there is minimal tenderness and swelling but it is getting better and patient wants to be discharged and he says if it gets worse he will come back  DOI: 1/29/2020  Pt had a crush type injury to distal left index.  He was seen through the ER.  X-rays were negative from nail was affixed with suturing.  Patient has progressed over interim.  Returns to clinic today stating has had an improved pain.  Steri-Strips hold nail in place.  Reports normal sensation and normal motion with some stiffness having been splinted for much of the interim.  Denies fevers chills or drainage.  Denies redness or swelling.  Does feel ready to trial full duty.  Denies use of over-the-counter medicines  "for pain as it has been minimally painful.    HPI  Review of Systems   Musculoskeletal: Positive for joint pain.   Neurological: Negative for sensory change and weakness.   All other systems reviewed and are negative.    Allergies   Allergen Reactions   • Pcn [Penicillins]      Oral swelling      Objective:   /70 (Patient Position: Sitting)   Pulse 74   Temp 36.8 °C (98.3 °F) (Temporal)   Resp 16   Ht 1.753 m (5' 9\")   Wt 81.6 kg (180 lb)   SpO2 96%   BMI 26.58 kg/m²   Physical Exam  Musculoskeletal:         General: Swelling, tenderness and signs of injury present.      Comments: Left index finger-healing laceration, minimal tenderness and swelling of the distal phalanx and range of motion is slightly limited due to swelling, no tendon injury       Physical Exam  Musculoskeletal:         General: Swelling, tenderness and signs of injury present.      Comments: Left index finger-healing laceration, minimal tenderness and swelling of the distal phalanx and range of motion is slightly limited due to swelling, no tendon injury        Assessment/Plan:   1. Laceration of left index finger without foreign body with damage to nail, subsequent encounter      Advised patient to take ibuprofen 604 times a day as needed, and if it gets worse to come back      Differential diagnosis, natural history, supportive care, and indications for immediate follow-up discussed.     "